# Patient Record
Sex: MALE | URBAN - METROPOLITAN AREA
[De-identification: names, ages, dates, MRNs, and addresses within clinical notes are randomized per-mention and may not be internally consistent; named-entity substitution may affect disease eponyms.]

---

## 2019-03-03 ENCOUNTER — HOSPITAL ENCOUNTER (EMERGENCY)
Facility: MEDICAL CENTER | Age: 24
End: 2019-03-03
Attending: EMERGENCY MEDICINE
Payer: COMMERCIAL

## 2019-03-03 VITALS
RESPIRATION RATE: 16 BRPM | BODY MASS INDEX: 25.12 KG/M2 | WEIGHT: 150.79 LBS | TEMPERATURE: 98.2 F | HEART RATE: 72 BPM | SYSTOLIC BLOOD PRESSURE: 110 MMHG | DIASTOLIC BLOOD PRESSURE: 60 MMHG | OXYGEN SATURATION: 99 % | HEIGHT: 65 IN

## 2019-03-03 DIAGNOSIS — R40.1 STUPOR: ICD-10-CM

## 2019-03-03 DIAGNOSIS — F10.921 ALCOHOL INTOXICATION WITH DELIRIUM (HCC): ICD-10-CM

## 2019-03-03 LAB — GLUCOSE BLD-MCNC: 109 MG/DL (ref 65–99)

## 2019-03-03 PROCEDURE — 99284 EMERGENCY DEPT VISIT MOD MDM: CPT

## 2019-03-03 PROCEDURE — 82962 GLUCOSE BLOOD TEST: CPT

## 2019-03-03 NOTE — ED TRIAGE NOTES
"Luigi Ashwin Aureliano  23 y.o. male  Chief Complaint   Patient presents with   • Alcohol Intoxication     Pt. bib [facility] for alcohol intoxication. Pt. is non-compliant with triage questions. Pt. is slurring speech and is unsteady on feet needing a one person assist to ambulated. No s/s of airway compromise or diff. breathing.      /64   Pulse 80   Temp 36.6 °C (97.9 °F) (Temporal)   Resp 16   Ht 1.651 m (5' 5\")   Wt 68.4 kg (150 lb 12.7 oz)   SpO2 94%   BMI 25.09 kg/m²       Pt amb to triage with steady gait for above complaint with one person assist.   Pt is alert and oriented, speaking in full sentences, follows commands and responds appropriately to questions. NAD. Resp are even and unlabored.  Pt placed in lobby. Pt educated on triage process. Pt encouraged to alert staff for any changes.  "

## 2019-03-03 NOTE — ED PROVIDER NOTES
ED Provider Note    CHIEF COMPLAINT  Chief Complaint   Patient presents with   • Alcohol Intoxication     Pt. bib [facility] for alcohol intoxication. Pt. is non-compliant with triage questions. Pt. is slurring speech and is unsteady on feet needing a one person assist to ambulated. No s/s of airway compromise or diff. breathing.        HPI  Luigi Bedoya is a 23 y.o. male who presents with alcohol intoxication.  Patient brought in by EMS for alcohol intoxication.  No witnessed trauma.  Patient admits to drinking alcohol.  Patient is significantly intoxicated and unable to provide any further history.    REVIEW OF SYSTEMS  ROS  See HPI for further details. All other systems are negative.     PAST MEDICAL HISTORY       SOCIAL HISTORY  Social History     Social History Main Topics   • Smoking status: Current Every Day Smoker   • Smokeless tobacco: Not on file   • Alcohol use Yes   • Drug use: Unknown   • Sexual activity: Not on file       SURGICAL HISTORY  patient denies any surgical history    CURRENT MEDICATIONS  Home Medications     Reviewed by Estelle Quesada R.N. (Registered Nurse) on 03/03/19 at 0355  Med List Status: Partial   Medication Last Dose Status        Patient Edwar Taking any Medications                       ALLERGIES  No Known Allergies    PHYSICAL EXAM  Physical Exam   Constitutional: He is oriented to person, place, and time. He appears well-developed and well-nourished.   Smells of alcohol   HENT:   Head: Normocephalic and atraumatic.   No hemotympanum, no evidence of trauma otherwise   Eyes: Pupils are equal, round, and reactive to light. Conjunctivae are normal.   Neck: Normal range of motion. Neck supple.   Cardiovascular: Normal rate and regular rhythm.  Exam reveals no gallop and no friction rub.    No murmur heard.  Pulmonary/Chest: Effort normal and breath sounds normal. No respiratory distress. He has no wheezes.   Abdominal: Soft. Bowel sounds are normal. He exhibits no  distension. There is no tenderness. There is no rebound.   Neurological: He is alert and oriented to person, place, and time.   Skin: Skin is warm and dry.   Psychiatric: He has a normal mood and affect. His behavior is normal.         COURSE & MEDICAL DECISION MAKING  Pertinent Labs & Imaging studies reviewed. (See chart for details)  Patient here with physician assistant with alcohol intoxication.  Will continue to observe patient in the emergency department for improvement of mental status, if mental status fails to improve despite prolonged observation consider checking further workup at that point however given patient's presentation, he smells of alcohol and admits to drinking I believe alcohol is likely the causative of etiology.    Patient will be signed out to oncoming emergency physician for reevaluation and discharge when clinically sober.      FINAL IMPRESSION  1.  Alcohol intoxication         Electronically signed by: Shyam Jain, 3/3/2019 5:01 AM

## 2019-03-03 NOTE — ED NOTES
MD at bedside prior to d/c. Pt given d/c instruction and verbalized understanding. No rx's given. Pt ambulatory to lobby, gait steady. Pt took all belongings from room.

## 2019-03-03 NOTE — ED PROVIDER NOTES
ED PROVIDER NOTE    Scribed for Thor Nichole M.D. by Melida Vann. 3/3/2019, 8:41 AM.    This is an addendum to the note on Luigi Bedoya. For further details and full chart entry, see the previously signed ED Provider Note written by Dr. Jain (ERP).      6:10 AM - I discussed the patient's case with Dr. Jain (ERP) who will transfer care of the patient to me at this time.        8:41 AM - Patient was reevaluated at bedside. He does not have any new complaints. Patient wakes up with no slurred speech.  He is able to ambulate without difficulty. He will be discharged home in stable condition.      FINAL IMPRESSION   1. Stupor    2. Alcohol intoxication with delirium (HCC)      Melida MURRELL (Scribe), am scribing for, and in the presence of, Thor Nichole M.D..  Electronically signed by: Melida Vann (Scribe), 3/3/2019  Thor MURRELL M.D. personally performed the services described in this documentation, as scribed by Melida Vann in my presence, and it is both accurate and complete. E.    The note accurately reflects work and decisions made by me.  Thor Nichole  3/3/2019  1:52 PM